# Patient Record
Sex: FEMALE | Race: WHITE | Employment: OTHER | URBAN - METROPOLITAN AREA
[De-identification: names, ages, dates, MRNs, and addresses within clinical notes are randomized per-mention and may not be internally consistent; named-entity substitution may affect disease eponyms.]

---

## 2018-02-01 ENCOUNTER — OFFICE VISIT (OUTPATIENT)
Dept: OTOLARYNGOLOGY | Facility: CLINIC | Age: 73
End: 2018-02-01
Payer: MEDICARE

## 2018-02-01 DIAGNOSIS — Z98.890 MOHS DEFECT OF NOSE: Primary | ICD-10-CM

## 2018-02-01 DIAGNOSIS — M95.0 MOHS DEFECT OF NOSE: Primary | ICD-10-CM

## 2018-02-01 PROCEDURE — 99205 OFFICE O/P NEW HI 60 MIN: CPT | Performed by: OTOLARYNGOLOGY

## 2018-02-01 RX ORDER — CLOPIDOGREL BISULFATE 75 MG/1
75 TABLET ORAL
COMMUNITY
Start: 2011-06-21

## 2018-02-01 RX ORDER — LISINOPRIL/HYDROCHLOROTHIAZIDE 10-12.5 MG
1 TABLET ORAL
COMMUNITY
Start: 2011-06-21

## 2018-02-01 RX ORDER — LEVOTHYROXINE SODIUM 88 UG/1
88 TABLET ORAL
COMMUNITY
Start: 2011-06-21

## 2018-02-01 RX ORDER — DILTIAZEM HYDROCHLORIDE 240 MG/1
240 CAPSULE, COATED, EXTENDED RELEASE ORAL
COMMUNITY
Start: 2011-06-21

## 2018-02-01 RX ORDER — ASPIRIN 81 MG/1
81 TABLET ORAL
COMMUNITY
Start: 2011-06-21

## 2018-02-01 RX ORDER — ATORVASTATIN CALCIUM 80 MG/1
80 TABLET, FILM COATED ORAL
COMMUNITY
Start: 2011-06-21

## 2018-02-01 RX ORDER — METOPROLOL TARTRATE 25 MG/1
25 TABLET, FILM COATED ORAL
COMMUNITY
Start: 2011-06-21

## 2018-02-01 ASSESSMENT — PAIN SCALES - GENERAL: PAINLEVEL: NO PAIN (0)

## 2018-02-01 NOTE — PATIENT INSTRUCTIONS
Wound Care Instructions:     Please keep your facial wound covered at all times with ointment to keep the wound healthy. Dryness and crusting means the wound is unhealthy. You many need to apply ointment every 2 hours while you are awake to keep the wound constantly moist. If the wound is near your eyes, use Erythromycin Ophthalmic Ointment (ointment that is safe to get into the eyes). Otherwise, if it is away from your eyes, use Vaseline on the wound. Make sure the wound is always moist and covered with ointment.    You can choose to wear a dressing or bandage to camoflauge the wound, but a dressing or bandage is not necessary unless you work in a dirty or ming environment. Covering the wound at all times with ointment to maintain moisture is necessary. If you wear a dressing or bandage, check under the dressing frequently to make sure the wound does not dry out.      You can shower and let the wound get wet in 48 hours. Do not scrub the wound. After your shower, gently pat the wound dry with a clean towel and apply more ointment.    Patients are often concerned about whether an open facial wound could get infected. It is very rare that an open wound gets a severe bacterial infection because bacteria can only sit on the surface of the wound and cannot penetrate into the deeper tissue leading to problems. Wounds commonly build up yellow or gray debris and appear infected even when they are not. This yellow or gray debris are waste products from the healing process combined with ointment. The best way to stop even the appearance of an infection is to keep your wound constantly moist and let the shower gently remove this debris once a day.      A more common type of infection is a minor fungal infection that results from keeping the wound moist with ointment. This is like diaper rash around your wound. Patients know that they have a fungal infection when they start experiencing severe itching, and discomfort around  the wound, and see discrete red patches away from the wound (called satellite lesions). If you suspect you have any type of infection, please call us.     Please contact our clinic if you have questions or if problems arise: [place] office: [phone]. If it is an urgent matter when the clinic is closed, please contact the [facility]  [phone] and ask to have Dr. Clinton Linares, or the ENT resident on-call paged. If it is a serious matter requiring immediate attention, please call 911 or go to your nearest emergency department.

## 2018-02-01 NOTE — PROGRESS NOTES
Surinder Armendariz MD   [facility]  [address] [place] [address]    Dear Doctor Marcello,    Thank you for asking me to see your patient, Ms. Minal Dias, in consultation to evaluate her nasal tip defect after Mohs surgery.  Today I had the pleasure of seeing her at the Facial Plastic and Reconstructive Surgery Clinic in the Department of Otolaryngology at [facility].    CLINICAL SUMMARY:   Diagnoses:  Nasal tip defect from basal cell carcinoma, s/p Mohs surgery by Dr. Armendariz.     Comorbidities: Diabetes, cardiomyopathy, carotid stenosis  Pertinent medications: Plavix, ASA  Photographs:  consents signed February 1, 2018.   Other: , 2 daughters, 3 grandchildren   Care Checklist:   _She requested time to consider her options and wants to return to discuss the options again in 3 weeks. She is deciding between secondary intention healing and reconstruction with straight line closure and if that distorts the nostrils excessively or creates nasal obstruction, we would either leave the wound partially open and continue with serial closure or perform a local flap for closure.   _Anticoagulation plan for plavix and ASA.       MEDICAL DECISION MAKING:      Nasal defect after Mohs surgery. The reconstructive options of healing by secondary intention versus skin grafting versus local flap reconstruction were described in detail.  After carefully considering the options, she requested time to consider her options and wants to return to discuss the options again in 3 weeks. She is deciding between secondary intention healing and reconstruction with straight line closure and if that distorts the nostrils excessively or creates nasal obstruction, we would either leave the wound partially open and continue with serial closure or perform a local flap for closure.     The patient understands the anticipated secondary intention healing  outcome of a depressed wound covered with scar tissue. We discussed the possibility of distortion of surrounding facial landmarks due to wound contracture including a change in her nostril margin that may result in nasal congestion or alar retraction. Our staff and I reviewed wound care in detail and emphasized the importance of maintaining wound moisture to promote healing.  I have asked her to follow up in 3 weeks for wound monitoring.  I encouraged her to call or return sooner if she has questions or if I can be of service.    It has been a pleasure to participate in the care of Ms. Dias.  Thank you for this kind referral.     Sincerely,    Clinton Linares MD    Division of Facial Plastic and Reconstructive Surgery,   Department of Otolaryngology  [facility]   ____________________________________________________          HISTORY OF PRESENT ILLNESS and SKIN CANCER QUESTIONAAIRE:  As you know, Ms. Dias is an72 year old-year-old female who presents with a facial defect after Mohs surgery.     Review of the Mohs or cancer removal documentation shows:   Date of Mohs surgery or skin cancer removal: 1/23/18.  Cancer type: basal cell carcinoma.  Margins: tumor free margins were obtained,  How would you rate your pain since your surgery? 0 (No pain)  Provider- How would you rate your pain since surgery? 0 (No pain)    Have you had any significant bleeding since your surgery? No  Provider- Have you had any significant bleeding since your surgery? No    Have you had any significant discharge since your surgery? No  Provider- Have you had any significant discharge since your surgery? No    Have you had any fevers since your surgery? No  Provider- Have you had any fevers since your surgery? No    No: Do you currently smoke?   Provider- Do you currently smoke? No    No: Have you had previous facial reconstruction?   Provider- Have you had previous facial reconstruction? Previous wound of  nasal tip was closed primarily in 2013    What was at the cancer site before the removal? bleeding sore  How long had you noticed the lesion or growth prior to having the removal? 2 month(s)  Did that lesion grow? Yes. If yes: Slow, No  Have you had a lot of sun exposure in the past? Yes    Do you remember blistering sunburns anywhere on your body? Yes  Have you used a tanning bed in the past? No    Have you smoked in the past?Yes  Have you had radiation to your head or neck in the past? No  Have you had previous skin cancers? Yes    For a defect site near or on the nose:   No: Did you have troubles breathing through your nose before Mohs surgery or cancer removal?   No: Any problems breathing through your nose after Mohs surgery or cancer removal?    Provider- Any problems breathing through your nose after Mohs surgery or cancer removal?  No        REVIEW OF SYSTEMS: a 12 system review was performed by the patient care staff:    Do you currently have or have you ever had in the past:    No: Complications with sedation or anesthesia.  Yes : Use blood thinners. Yes:  ASA.  Plavix.   Yes - cardiomyopathy: Any heart problems.   No: Chest pain.   No: A pacemaker.  No: Problems with excessive bleeding or a bleeding disorder.  No: Problems with blood clots or a clotting disorder.   No: Sleep apnea or sleep with a CPAP machine.       No: Excessive scarring.   No: Night sweats.   No: Fevers.   No: Double vision.   No: Vision loss.   Yes: Snoring.   No: Difficulty breathing through your nose.   Yes : Runny nose.   No: Sneezing.   No: Itchy eyes.   No: Itchy throat.   No: Face pain.   No: Face weakness.   No: Face numbness.   No: Difficulty swallowing.   No: Pain with swallowing.,   No: Difficulty hearing or hearing loss.   No: Difficulty urinating.   No: Anxiety.   No: Depression.     FAMILY HISTORY:  No: Family history of excessive bleeding or a bleeding disorder.    No: Family history of blood clots or a clotting  disorder.    Yes: Family history of skin cancer.    History reviewed. No pertinent family history.    PAST MEDICAL HISTORY: History reviewed. No pertinent past medical history. Heart disease, carotid stenosis.    PAST SURGICAL HISTORY: History reviewed. No pertinent surgical history. Cervical disc repaired. Laproscopic release of adhesions.     SOCIAL HISTORY:   Social History   Substance Use Topics     Smoking status: Not on file     Smokeless tobacco: Not on file     Alcohol use Not on file   no smoking.     ALLERGIES: Liquid adhesive and Sulfa drugs    MEDICATIONS:   Current Outpatient Prescriptions   Medication Sig Dispense Refill     aspirin EC 81 MG EC tablet Take 81 mg by mouth       atorvastatin (LIPITOR) 80 MG tablet Take 80 mg by mouth       clopidogrel (PLAVIX) 75 MG tablet Take 75 mg by mouth       diltiazem 240 MG 24 hr capsule Take 240 mg by mouth       levothyroxine (SYNTHROID/LEVOTHROID) 88 MCG tablet Take 88 mcg by mouth       lisinopril-hydrochlorothiazide (PRINZIDE/ZESTORETIC) 10-12.5 MG per tablet Take 1 tablet by mouth       metFORMIN (GLUCOPHAGE) 1000 MG tablet Take 1,000 mg by mouth       metoprolol tartrate (LOPRESSOR) 25 MG tablet Take 25 mg by mouth       tiZANidine (ZANAFLEX) 4 MG tablet        traMADol-acetaminophen (ULTRACET) 37.5-325 MG per tablet        SITagliptin Phosphate (JANUVIA PO) Take 25 mg by mouth daily         Defect size per Mohs record or operative report: 10mm x 9mm    Patient Care Staff Signature: Cynthia Pierce RN  ______________________________________________    Provider- Review of systems, FH, PMH, PSH, SH, ALL, and Medications taken by the patient care staff was reviewed by me: Clinton Linares      Provider- PHYSICAL EXAMINATION:  CONSTITUTIONAL:  No apparent distress.  Pleasant affect.  Normal ability to communicate.  CRANIOFACIAL:  Normocephalic, atraumatic.    SKIN:  10x9mm defect.   Subunits involved: central nasal tip centered just to the left of midline.    Nearby landmarks: left nostril margin 10mm from wound.   Depth: through dermis.  Surrounding skin is viable. No bleeding.    EYES:  Extraocular muscles intact.  EARS:  Normal auricles.  Tympanic membranes clear bilaterally.  NOSE: No external nasal valve collapse.  Slight septal deviation.  No polyps or purulence.  ORAL CAVITY AND OROPHARYNX: no lesions on inspection.  NECK:  The parotid is soft, without masses.  Supple laryngeal landmarks.  LYMPHATIC:  No palpable lymphadenopathy.  CARDIOVASCULAR:  Carotid pulses are palpable bilaterally.  NEUROLOGIC:  Facial nerve intact.  RESPIRATORY:  Normal respiratory effort.  No stridor.  Voice strong.      Provider-: PREOPERATIVE COUNSELING: An extensive preoperative discussion was held. The patient stated she understood the risks, benefits, alternatives and limitations of the procedure. The risks including but not limited to bleeding, infection, damage to surrounding structures, numbness, weakness, cancer recurrence, chronic pain, poor aesthetic result, partial or total skin loss, distortion of surrounding facial structures, and unforeseen complications related to surgery or anesthesia were described. I emphasized the risks of partial or total skin loss at the surgical sites. She also understands the possibility of distortion of surrounding facial structures including her nostril margin which may result in alar retraction or nasal congestion. I discussed the limitations of this procedure in that the donor site will have anticipated scars and complications can occur at the donor site.  She understands that more skin may need to be excised during the reconstruction. We discussed how additional surgeries may be needed to obtain an optimal result.    We discussed how the patient's heart disease may result in cardiac complications and anticoagluation may result in bleeding complications.    I described how no reconstructive effort will be able to restore her to her exact  precancer condition. We also acknowledged that facial asymmetries will be present after the reconstruction. The patient stated she had her questions answered to her satisfaction.

## 2018-02-01 NOTE — LETTER
2/1/2018         RE: Minal Dias  [address] [facility] [address]        Dear Colleague,    Thank you for referring your patient, Minal Dias, to the [facility]. Please see a copy of my visit note below.    Surinder Armendariz MD   [facility]  [address] [place] [id number]    Dear Doctor Marcello,    Thank you for asking me to see your patient, Ms. Minal Dias, in consultation to evaluate her nasal tip defect after Mohs surgery.  Today I had the pleasure of seeing her at the Facial Plastic and Reconstructive Surgery Clinic in the Department of Otolaryngology at [facility].    CLINICAL SUMMARY:   Diagnoses:  Nasal tip defect from basal cell carcinoma, s/p Mohs surgery by Dr. Armendariz.     Comorbidities: Diabetes, cardiomyopathy, carotid stenosis  Pertinent medications: Plavix, ASA  Photographs: [facility] consents signed February 1, 2018.   Other: , 2 daughters, 3 grandchildren   Care Checklist:   _She requested time to consider her options and wants to return to discuss the options again in 3 weeks. She is deciding between secondary intention healing and reconstruction with straight line closure and if that distorts the nostrils excessively or creates nasal obstruction, we would either leave the wound partially open and continue with serial closure or perform a local flap for closure.   _Anticoagulation plan for plavix and ASA.       MEDICAL DECISION MAKING:      Nasal defect after Mohs surgery. The reconstructive options of healing by secondary intention versus skin grafting versus local flap reconstruction were described in detail.  After carefully considering the options, she requested time to consider her options and wants to return to discuss the options again in 3 weeks. She is deciding between secondary intention healing and reconstruction with straight line closure and if that distorts the  nostrils excessively or creates nasal obstruction, we would either leave the wound partially open and continue with serial closure or perform a local flap for closure.     The patient understands the anticipated secondary intention healing outcome of a depressed wound covered with scar tissue. We discussed the possibility of distortion of surrounding facial landmarks due to wound contracture including a change in her nostril margin that may result in nasal congestion or alar retraction. Our staff and I reviewed wound care in detail and emphasized the importance of maintaining wound moisture to promote healing.  I have asked her to follow up in 3 weeks for wound monitoring.  I encouraged her to call or return sooner if she has questions or if I can be of service.    It has been a pleasure to participate in the care of Ms. Dias.  Thank you for this kind referral.     Sincerely,    Clinton Linares MD    Division of Facial Plastic and Reconstructive Surgery,   Department of Otolaryngology  [facility]   ____________________________________________________          HISTORY OF PRESENT ILLNESS and SKIN CANCER QUESTIONAAIRE:  As you know, Ms. Dias is an72 year old-year-old female who presents with a facial defect after Mohs surgery.     Review of the Mohs or cancer removal documentation shows:   Date of Mohs surgery or skin cancer removal: 1/23/18.  Cancer type: basal cell carcinoma.  Margins: tumor free margins were obtained,  How would you rate your pain since your surgery? 0 (No pain)  Provider- How would you rate your pain since surgery? 0 (No pain)    Have you had any significant bleeding since your surgery? No  Provider- Have you had any significant bleeding since your surgery? No    Have you had any significant discharge since your surgery? No  Provider- Have you had any significant discharge since your surgery? No    Have you had any fevers since your surgery? No  Provider-  Have you had any fevers since your surgery? No    No: Do you currently smoke?   Provider- Do you currently smoke? No    No: Have you had previous facial reconstruction?   Provider- Have you had previous facial reconstruction? Previous wound of nasal tip was closed primarily in 2013    What was at the cancer site before the removal? bleeding sore  How long had you noticed the lesion or growth prior to having the removal? 2 month(s)  Did that lesion grow? Yes. If yes: Slow, No  Have you had a lot of sun exposure in the past? Yes    Do you remember blistering sunburns anywhere on your body? Yes  Have you used a tanning bed in the past? No    Have you smoked in the past?Yes  Have you had radiation to your head or neck in the past? No  Have you had previous skin cancers? Yes    For a defect site near or on the nose:   No: Did you have troubles breathing through your nose before Mohs surgery or cancer removal?   No: Any problems breathing through your nose after Mohs surgery or cancer removal?    Provider- Any problems breathing through your nose after Mohs surgery or cancer removal?  No        REVIEW OF SYSTEMS: a 12 system review was performed by the patient care staff:    Do you currently have or have you ever had in the past:    No: Complications with sedation or anesthesia.  Yes : Use blood thinners. Yes:  ASA.  Plavix.   Yes - cardiomyopathy: Any heart problems.   No: Chest pain.   No: A pacemaker.  No: Problems with excessive bleeding or a bleeding disorder.  No: Problems with blood clots or a clotting disorder.   No: Sleep apnea or sleep with a CPAP machine.       No: Excessive scarring.   No: Night sweats.   No: Fevers.   No: Double vision.   No: Vision loss.   Yes: Snoring.   No: Difficulty breathing through your nose.   Yes : Runny nose.   No: Sneezing.   No: Itchy eyes.   No: Itchy throat.   No: Face pain.   No: Face weakness.   No: Face numbness.   No: Difficulty swallowing.   No: Pain with swallowing.,   No:  Difficulty hearing or hearing loss.   No: Difficulty urinating.   No: Anxiety.   No: Depression.     FAMILY HISTORY:  No: Family history of excessive bleeding or a bleeding disorder.    No: Family history of blood clots or a clotting disorder.    Yes: Family history of skin cancer.    History reviewed. No pertinent family history.    PAST MEDICAL HISTORY: History reviewed. No pertinent past medical history. Heart disease, carotid stenosis.    PAST SURGICAL HISTORY: History reviewed. No pertinent surgical history. Cervical disc repaired. Laproscopic release of adhesions.     SOCIAL HISTORY:   Social History   Substance Use Topics     Smoking status: Not on file     Smokeless tobacco: Not on file     Alcohol use Not on file   no smoking.     ALLERGIES: Liquid adhesive and Sulfa drugs    MEDICATIONS:   Current Outpatient Prescriptions   Medication Sig Dispense Refill     aspirin EC 81 MG EC tablet Take 81 mg by mouth       atorvastatin (LIPITOR) 80 MG tablet Take 80 mg by mouth       clopidogrel (PLAVIX) 75 MG tablet Take 75 mg by mouth       diltiazem 240 MG 24 hr capsule Take 240 mg by mouth       levothyroxine (SYNTHROID/LEVOTHROID) 88 MCG tablet Take 88 mcg by mouth       lisinopril-hydrochlorothiazide (PRINZIDE/ZESTORETIC) 10-12.5 MG per tablet Take 1 tablet by mouth       metFORMIN (GLUCOPHAGE) 1000 MG tablet Take 1,000 mg by mouth       metoprolol tartrate (LOPRESSOR) 25 MG tablet Take 25 mg by mouth       tiZANidine (ZANAFLEX) 4 MG tablet        traMADol-acetaminophen (ULTRACET) 37.5-325 MG per tablet        SITagliptin Phosphate (JANUVIA PO) Take 25 mg by mouth daily         Defect size per Mohs record or operative report: 10mm x 9mm    Patient Care Staff Signature: Cynthia Pierce RN  ______________________________________________    Provider- Review of systems, FH, PMH, PSH, SH, ALL, and Medications taken by the patient care staff was reviewed by me: Clinton Linares      Provider- PHYSICAL  EXAMINATION:  CONSTITUTIONAL:  No apparent distress.  Pleasant affect.  Normal ability to communicate.  CRANIOFACIAL:  Normocephalic, atraumatic.    SKIN:  10x9mm defect.   Subunits involved: central nasal tip centered just to the left of midline.   Nearby landmarks: left nostril margin 10mm from wound.   Depth: through dermis.  Surrounding skin is viable. No bleeding.    EYES:  Extraocular muscles intact.  EARS:  Normal auricles.  Tympanic membranes clear bilaterally.  NOSE: No external nasal valve collapse.  Slight septal deviation.  No polyps or purulence.  ORAL CAVITY AND OROPHARYNX: no lesions on inspection.  NECK:  The parotid is soft, without masses.  Supple laryngeal landmarks.  LYMPHATIC:  No palpable lymphadenopathy.  CARDIOVASCULAR:  Carotid pulses are palpable bilaterally.  NEUROLOGIC:  Facial nerve intact.  RESPIRATORY:  Normal respiratory effort.  No stridor.  Voice strong.      Provider-: PREOPERATIVE COUNSELING: An extensive preoperative discussion was held. The patient stated she understood the risks, benefits, alternatives and limitations of the procedure. The risks including but not limited to bleeding, infection, damage to surrounding structures, numbness, weakness, cancer recurrence, chronic pain, poor aesthetic result, partial or total skin loss, distortion of surrounding facial structures, and unforeseen complications related to surgery or anesthesia were described. I emphasized the risks of partial or total skin loss at the surgical sites. She also understands the possibility of distortion of surrounding facial structures including her nostril margin which may result in alar retraction or nasal congestion. I discussed the limitations of this procedure in that the donor site will have anticipated scars and complications can occur at the donor site.  She understands that more skin may need to be excised during the reconstruction. We discussed how additional surgeries may be needed to obtain an  optimal result.    We discussed how the patient's heart disease may result in cardiac complications and anticoagluation may result in bleeding complications.    I described how no reconstructive effort will be able to restore her to her exact precancer condition. We also acknowledged that facial asymmetries will be present after the reconstruction. The patient stated she had her questions answered to her satisfaction.    Again, thank you for allowing me to participate in the care of your patient.        Sincerely,        Clinton Linares MD

## 2018-02-01 NOTE — MR AVS SNAPSHOT
After Visit Summary   2/1/2018    Minal Dias    MRN: [id number]           Patient Information     Date Of Birth          1945        Visit Information        Provider Department      2/1/2018 12:00 PM Clinton Linares MD [facility]        Today's Diagnoses     Mohs defect of nose    -  1      Care Instructions    Wound Care Instructions:     Please keep your facial wound covered at all times with ointment to keep the wound healthy. Dryness and crusting means the wound is unhealthy. You many need to apply ointment every 2 hours while you are awake to keep the wound constantly moist. If the wound is near your eyes, use Erythromycin Ophthalmic Ointment (ointment that is safe to get into the eyes). Otherwise, if it is away from your eyes, use Vaseline on the wound. Make sure the wound is always moist and covered with ointment.    You can choose to wear a dressing or bandage to camoflauge the wound, but a dressing or bandage is not necessary unless you work in a dirty or ming environment. Covering the wound at all times with ointment to maintain moisture is necessary. If you wear a dressing or bandage, check under the dressing frequently to make sure the wound does not dry out.      You can shower and let the wound get wet in 48 hours. Do not scrub the wound. After your shower, gently pat the wound dry with a clean towel and apply more ointment.    Patients are often concerned about whether an open facial wound could get infected. It is very rare that an open wound gets a severe bacterial infection because bacteria can only sit on the surface of the wound and cannot penetrate into the deeper tissue leading to problems. Wounds commonly build up yellow or gray debris and appear infected even when they are not. This yellow or gray debris are waste products from the healing process combined with ointment. The best way to stop even the appearance of an infection is to keep your  wound constantly moist and let the shower gently remove this debris once a day.      A more common type of infection is a minor fungal infection that results from keeping the wound moist with ointment. This is like diaper rash around your wound. Patients know that they have a fungal infection when they start experiencing severe itching, and discomfort around the wound, and see discrete red patches away from the wound (called satellite lesions). If you suspect you have any type of infection, please call us.     Please contact our clinic if you have questions or if problems arise: [place] office: [phone]. If it is an urgent matter when the clinic is closed, please contact the [facility]  [phone] and ask to have Dr. Clinton Linares, or the ENT resident on-call paged. If it is a serious matter requiring immediate attention, please call 911 or go to your nearest emergency department.            Follow-ups after your visit        Follow-up notes from your care team     Return in about 3 weeks (around 2/22/2018).      Your next 10 appointments already scheduled     Feb 22, 2018 10:00 AM CST   Return Visit with Clinton Linares MD   [facility]    [id number] [place] [id number] [phone]              Who to contact     If you have questions or need follow up information about today's clinic visit or your schedule please contact [facility] directly at [phone].  Normal or non-critical lab and imaging results will be communicated to you by MyChart, letter or phone within 4 business days after the clinic has received the results. If you do not hear from us within 7 days, please contact the clinic through MyChart or phone. If you have a critical or abnormal lab result, we will notify you by phone as soon as possible.  Submit refill requests through [employer] or call your pharmacy and they will forward the  refill request to us. Please allow 3 business days for your refill to be completed.          Additional Information About Your Visit        [employer] Information     [employer] is an electronic gateway that provides easy, online access to your medical records. With [employer], you can request a clinic appointment, read your test results, renew a prescription or communicate with your care team.     To sign up for [employer] visit the website at [email or web address]   You will be asked to enter the access code listed below, as well as some personal information. Please follow the directions to create your username and password.     Your access code is: [email or web address]  Expires: 2018  1:11 PM     Your access code will  in 90 days. If you need help or a new code, please contact your [facility] or call [phone] for assistance.        Care EveryWhere ID     This is your Care EveryWhere ID. This could be used by other organizations to access your [place] medical records  [id number]         Blood Pressure from Last 3 Encounters:   No data found for BP    Weight from Last 3 Encounters:   No data found for Wt              Today, you had the following     No orders found for display       Primary Care Provider Office Phone # Fax #    [facility] [phone]       [address]  [facility] [address]        Equal Access to Services     MADDISON BLAKELY : Hadii aad ku hadasho Soomaali, waaxda luqadaha, qaybta kaalmada adeegyada, waxay gem haymike peraza. So [facility] [phone].    ATENCIÓN: Si habla español, tiene a mcgill disposición servicios gratuitos de asistencia lingüística. Llame al [phone].    We comply with applicable federal civil rights laws and [place] laws. We do not discriminate on the basis of race, color, national origin, age, disability, sex, sexual orientation, or gender identity.            Thank you!     Thank you  for choosing [facility]  for your care. Our goal is always to provide you with excellent care. Hearing back from our patients is one way we can continue to improve our services. Please take a few minutes to complete the written survey that you may receive in the mail after your visit with us. Thank you!             Your Updated Medication List - Protect others around you: Learn how to safely use, store and throw away your medicines at www.disposemymeds.org.          This list is accurate as of 2/1/18  1:25 PM.  Always use your most recent med list.                   Brand Name Dispense Instructions for use Diagnosis    aspirin EC 81 MG EC tablet      Take 81 mg by mouth        atorvastatin 80 MG tablet    LIPITOR     Take 80 mg by mouth        clopidogrel 75 MG tablet    PLAVIX     Take 75 mg by mouth        diltiazem 240 MG 24 hr capsule      Take 240 mg by mouth        JANUVIA PO      Take 25 mg by mouth daily        levothyroxine 88 MCG tablet    SYNTHROID/LEVOTHROID     Take 88 mcg by mouth        lisinopril-hydrochlorothiazide 10-12.5 MG per tablet    PRINZIDE/ZESTORETIC     Take 1 tablet by mouth        metFORMIN 1000 MG tablet    GLUCOPHAGE     Take 1,000 mg by mouth        metoprolol tartrate 25 MG tablet    LOPRESSOR     Take 25 mg by mouth        tiZANidine 4 MG tablet    ZANAFLEX          traMADol-acetaminophen 37.5-325 MG per tablet    ULTRACET

## 2019-11-30 ENCOUNTER — HOSPITAL ENCOUNTER (OUTPATIENT)
Dept: MRI IMAGING | Facility: CLINIC | Age: 74
Discharge: HOME | End: 2019-11-30
Attending: NURSE PRACTITIONER | Admitting: NURSE PRACTITIONER
Payer: MEDICARE

## 2019-11-30 DIAGNOSIS — M54.16 LUMBAR RADICULOPATHY, RIGHT: ICD-10-CM

## 2019-11-30 PROCEDURE — 72148 MRI LUMBAR SPINE W/O DYE: CPT

## 2019-12-12 ENCOUNTER — TRANSFERRED RECORDS (OUTPATIENT)
Dept: HEALTH INFORMATION MANAGEMENT | Facility: CLINIC | Age: 74
End: 2019-12-12

## 2019-12-16 ENCOUNTER — TELEPHONE (OUTPATIENT)
Dept: PALLIATIVE MEDICINE | Facility: CLINIC | Age: 74
End: 2019-12-16

## 2019-12-16 NOTE — TELEPHONE ENCOUNTER
"Received 12-    [facility] (Theresa Abdi, NP) faxed an L4-5 interlaminar BENJIE order for lumbar radiculopathy, right (M54.16). Per comments section on order \"Can do L5-S1 or L4-5 if epidural space not adequate.\"    Included in fax was the following:    Patient facesheet with billing/payor info    A specific [facility] site to schedule at    Excluded from fax was:    Recent progress notes    I faxed [facility] back and requested that they fax us the progress notes for patient's recent appts. Fax confirmation was received.        Routing to the .    **Please close encounter once everything (scheduling, prior authorization, etc) has been done.**      Roxy [place]  Patient Representative  [facility]  "

## 2019-12-18 NOTE — TELEPHONE ENCOUNTER
Called pt and left message to call back and informed the  staff the following information:   1.  Name of the prescribing provider  2. Clinic name  3. Clinic phone #    Rosamaria Rebolledo RN-BSN  [facility]

## 2019-12-18 NOTE — TELEPHONE ENCOUNTER
Patient called back:      1. Lizeth Gomez    2. Bryce Timmons    3. [phone]      Saritha Escalera    [facility]

## 2019-12-18 NOTE — TELEPHONE ENCOUNTER
Received 12-    [facility] faxed the requested recent progress notes to [facility]. I indexed notes into this encounter.      Roxy [place]  Patient Representative  [facility]

## 2019-12-18 NOTE — TELEPHONE ENCOUNTER
Pre-screening Questions for Radiology Injections:    Injection to be done at which interventional clinic site? [facility] [place]    Instruct patient to arrive as directed prior to the scheduled appointment time:    [place] minutes before      [place]: 30 minutes before; if IV needed 1 hour before     Procedure ordered by Jin    Procedure ordered? L4-5 interlaminar BENJIE      Transforaminal Cervical BENJIE - Dr. Joann Everett ONLY    What insurance would patient like us to bill for this procedure? Medicare/BC      Worker's comp or MVA (motor vehicle accident) -Any injection DO NOT SCHEDULE and route to Audelia Fuller.      [employer] insurance - For SI joint injections, DO NOT SCHEDULE and route Audelia Fuller.       [employer] - Any injection besides hip/shoulder/knee joint DO NOT SCHEDULE and route to Audelia Fuller. She will obtain PA and call pt back to schedule procedure or notify pt of denial.       HP [employer]-Route to Audelia for review      **BCBS- ALL need to be routed to [place] for review if a PA is needed**      IF SCHEDULING IN [place] AND NEEDS A PA, IT IS OKAY TO SCHEDULE. [place] HANDLES THEIR OWN PA'S AFTER THE PATIENT IS SCHEDULED. PLEASE SCHEDULE AT LEAST 1 WEEK OUT SO A PA CAN BE OBTAINED.    Any chance of pregnancy? NO   If YES, do NOT schedule and route to RN pool    Is an  needed? No     Patient has a drive home? (mandatory) YES: ok    Is patient taking any blood thinners (i.e. plavix, coumadin, jantoven, warfarin, heparin, pradaxa or dabigatran, etc)? Yes - Plavix   If hold needed, do NOT schedule, route to RN pool     Is patient taking any aspirin products (includes Excedrin and Fiorinal)? Yes - Pt takes 81mg daily; instructed to hold 0 day(s) prior to procedure.      If more than 325mg/day do NOT schedule; route to RN pool     For CERVICAL procedures, hold all aspirin products for 6 days.     Tell pt that if aspirin product is not held for 6 days, the procedure WILL  BE cancelled.      Does the patient have a bleeding or clotting disorder? No     If YES, okay to schedule AND route to RN nurse pool    For any patients with platelet count <100, must be forwarded to provider    Is patient diabetic?  Yes  If YES, instruct them to bring their glucometer.    Does patient have an active infection or treated for one within the past week? No     Is patient currently taking any antibiotics?  No     For patients on chronic, preventative, or prophylactic antibiotics, procedures may be scheduled.     For patients on antibiotics for active or recent infection:antibiotic course must have been completed for 4 days    Is patient currently taking any steroid medications? (i.e. Prednisone, Medrol)  No     For patients on steroid medications, course must have been completed for 4 days    Reviewed with patient:  If you are started on any steroids or antibiotics between now and your appointment, you must contact us because the procedure may need to be cancelled.  Yes    Is patient actively being treated for cancer or immunocompromised? No  If YES, do NOT schedule and route to RN pool     Are you able to get on and off an exam table with minimal or no assistance? Yes  If NO, do NOT schedule and route to RN pool    Are you able to roll over and lay on your stomach with minimal or no assistance? Yes  If NO, do NOT schedule and route to RN pool     Any allergies to contrast dye, iodine, shellfish, or numbing and steroid medications? No  If YES, route to RN pool AND add allergy information to appointment notes    Allergies: Liquid adhesive and Sulfa drugs      Has the patient had a flu shot or any other vaccinations within 7 days before or after the procedure.  No     Does patient have an MRI/CT?  YES: MRI  Check Procedure Scheduling Grid to see if required.      Was the MRI done within the last 3 years?  Yes    If yes, where [facility] etc? [facility]  [place]      If no, do not schedule and route to RN pool    If MRI was not done at [place], [facility] or [facility] do NOT schedule and route to RN pool.      If pt has an imaging disc, the injection MAY be scheduled but pt has to bring disc to appt.     If they show up without the disc the injection cannot be done    Reminders (please tell patient if applicable):       Instructed pt to arrive 30 minutes early for IV start if required. (Check Procedure Scheduling Grid)  Not Applicable      If celiac plexus block, informed patient NPO for 6 hours and that it is okay to take medications with sips of water, especially blood pressure medications  Not Applicable         If this is for a cervical procedure, informed patient that aspirin needs to be held for 6 days.   Not Applicable      For all patients not having spinal cord stimulator (SCS) trials or radiofrequency ablations (RFAs), informed patient:    IV sedation is not provided for this procedure.  If you feel that an oral anti-anxiety medication is needed, you can discuss this further with your referring provider or primary care provider.  The Pain Clinic provider will discuss specifics of what the procedure includes at your appointment.  Most procedures last 10-20 minutes.  We use numbing medications to help with any discomfort during the procedure.  Not Applicable      Do not schedule procedures requiring IV placement in the first appointment of the day or first appointment after lunch. Do NOT schedule at 0745, 0815 or 1245.       For patients 85 or older we recommend having an adult stay w/ them for the remainder of the day.       Does the patient have any questions?  NO  Kita Valles  [facility]

## 2019-12-19 NOTE — TELEPHONE ENCOUNTER
Called Dr. Gomez's office.  They will call back on shared line w/ the approval/denial for the 7 day hold.    Rosamaria Rebolledo RN-BSN  [facility]

## 2019-12-20 NOTE — TELEPHONE ENCOUNTER
No PA required, okay to schedule        Audelia PATRICIA    [facility]

## 2019-12-23 NOTE — TELEPHONE ENCOUNTER
Dr. Gomez's office called back.  Okay to hold the plavix for the 7 days.    Called pt.     Injection scheduled for:  1/7/19  Plavix hold starts on:  1/31/19  Is lovenox bridging needed?  Not Applicable  Injection intake questions reviewed?  YES  Infection/antibiotic information reviewed?  YES  INR order in?: Not Applicable  Lab appointment done?  Not Applicable    Rosamaria Rebolledo RN-BSN  [facility]

## 2019-12-23 NOTE — TELEPHONE ENCOUNTER
Bee and Liz from Dr. Gomez's office calling back.  They need to know what procedure is going to be done. [phone].    Called back ands informed the staff there that it is for a lumbar epidural steroid injection.    Waiting for response back.    Rosamaria Rebolledo, RN-BSN  [facility]

## 2020-01-07 ENCOUNTER — ANCILLARY PROCEDURE (OUTPATIENT)
Dept: RADIOLOGY | Facility: CLINIC | Age: 75
End: 2020-01-07
Attending: PAIN MEDICINE
Payer: MEDICARE

## 2020-01-07 ENCOUNTER — RADIOLOGY INJECTION OFFICE VISIT (OUTPATIENT)
Dept: PALLIATIVE MEDICINE | Facility: CLINIC | Age: 75
End: 2020-01-07
Payer: MEDICARE

## 2020-01-07 VITALS — SYSTOLIC BLOOD PRESSURE: 191 MMHG | DIASTOLIC BLOOD PRESSURE: 84 MMHG | HEART RATE: 54 BPM | OXYGEN SATURATION: 97 %

## 2020-01-07 DIAGNOSIS — M54.16 LUMBAR RADICULOPATHY: Primary | ICD-10-CM

## 2020-01-07 DIAGNOSIS — M54.16 LUMBAR RADICULOPATHY: ICD-10-CM

## 2020-01-07 PROCEDURE — 62323 NJX INTERLAMINAR LMBR/SAC: CPT | Performed by: PAIN MEDICINE

## 2020-01-07 ASSESSMENT — PAIN SCALES - GENERAL: PAINLEVEL: MODERATE PAIN (5)

## 2020-01-07 NOTE — NURSING NOTE
Discharge Information    IV Discontiued Time:  NA    Amount of Fluid Infused:  NA    Discharge Criteria = When patient returns to baseline or as per MD order    Consciousness:  Pt is fully awake    Circulation:  BP +/- 20% of pre-procedure level    Respiration:  Patient is able to breathe deeply    O2 Sat:  Patient is able to maintain O2 Sat >92% on room air    Activity:  Moves 4 extremities on command    Ambulation:  Patient is able to stand and walk or stand and pivot into wheelchair    Dressing:  Clean/dry or No Dressing    Notes:   Discharge instructions and AVS given to patient    Patient meets criteria for discharge?  YES    Admitted to PCU?  No    Responsible adult present to accompany patient home?  Yes    Signature/Title:    corky hernandez RN  RN Care Coordinator  [facility] [place]

## 2020-01-07 NOTE — PROGRESS NOTES
Pre procedure Diagnosis: Lumbar radiculopathy  Post procedure Diagnosis: Same  Procedure performed: L4-5 interlaminar epidural steroid injection   Anesthesia: None  Complications: None  Operators: Alfredo Ness MD     Indications:   Minal Dias is a 74 year old female.  The patient has a history of right-sided low back pain radiating to her lower extremity.  Specifically to her anterior ankle.  Examination shows negative SLR.  she has tried conservative treatment including meds.    MR  T12-L1: Minimal facet hypertrophy. Normal disc. No stenosis.     L1-L2: Mild facet hypertrophy and minimal disc bulging is present.  There is no stenosis.     L2-L3: Broad-based disc bulging and moderate facet and ligament flavum  hypertrophy is present causing mild to moderate central canal stenosis  but no neural foraminal stenosis.     L3-L4: Broad-based disc bulge and moderate to severe facet and  ligament flavum hypertrophy is present along with some minimal  spondylolisthesis. There is secondary moderate central canal stenosis  and mild bilateral neural foraminal stenosis.     L4-L5: Grade 1 degenerative spondylolisthesis is present along with  severe facet and ligamentum flavum hypertrophy and some broad-based  disc bulging. There is also a right posterolateral disc protrusion.  There is secondary moderate to severe central canal stenosis, moderate  right-sided foraminal stenosis and mild left-sided foraminal stenosis.     L5-S1: Moderate facet hypertrophy is present. The intervertebral discs  show some minimal disc bulging. There is no significant stenosis.     Paraspinal soft tissues: There is a focal indeterminate 1 cm lesion  within the lateral aspect of the right kidney.                                                                      IMPRESSION:  1. Multilevel degenerative disc and facet disease most advanced at  L4-L5 where there is moderate to severe central canal stenosis,  moderate right-sided foraminal  stenosis and mild left-sided foraminal  stenosis. There is also a right posterolateral disc protrusion at this  level.  2. Indeterminate 1 cm right renal lesion. If clinically indicated,  ultrasound might be helpful in further evaluation.  Options/alternatives, benefits and risks were discussed with the patient including but not limited to bleeding, infection, no pain relief, tissue trauma, exposure to radiation, reaction to medications, spinal cord injury, dural puncture, weakness, numbness and headache.  Questions were answered to her satisfaction and she wishes to proceed. Voluntary informed consent was obtained and signed.     Vitals were reviewed: Yes  Allergies were reviewed:  Yes   Medications were reviewed:  Yes  Pre-procedure pain score: 5/10    Procedure:  The patient's medical history, medications, and allergies were reviewed and reconciled.  After obtaining signed informed consent, the patient was brought into the procedure suite and was placed in a prone position on the procedure table.   A Pause for the Cause was performed.  Patient was prepped and draped in the usual sterile fashion.     The L 4-5 interspace was identified with AP fluoroscopy.  A total of 1ml of 1% lidocaine was used to anesthetize the skin and subcutaneous tissues for a right of midline approach.    A 20gauge 3.5inch Touhy needle was advanced utilizing intermittent AP and Lateral fluoroscopy and air for loss of resistance.  The epidural space was encountered on the first pass without difficulties.  Aspiration for blood and CSF was negative.  Needle position was verified by injecting 1 ml of Omnipaque utilizing real-time fluoroscopy that showed good needle placement and epidural spread without signs of intravascular or intrathecal uptake.  Omnipaque wasted:  9 ml.    Then, after repeated negative aspiration for blood or CSF, a combination of Kenalog 40 mg, Bupivicaine 0.25% 2 ml, diluted with 3 ml of normal saline to a total injectate  volume of 6 ml was injected into the epidural space in a slow and incremental fashion and the needle was restyletted and withdrawn.  All injected medications were preservative free.    The injection site was cleaned and a sterile dressing was applied.    The patient tolerated the procedure well without complications and was taken to the recovery room for continued observation.    Images were saved to PACS.    Post-procedure pain score: 5/10  Follow-up includes:   -f/u phone call in one week  -f/u with referring provider     Alfredo Ness MD  [facility] [place]

## 2020-01-07 NOTE — NURSING NOTE
Pre-procedure Intake    Have you been fasting? No     If yes, for how long? No     Are you taking a prescribed blood thinner such as coumadin, Plavix, Xarelto? Yes, plavix    If yes, when did you take your last dose? 12/30/19    Do you take aspirin?  No    If cervical procedure, have you held aspirin for 6 days?   No     Do you have any allergies to contrast dye, iodine, steroid and/or numbing medications?  NO    Are you currently taking antibiotics or have an active infection?  NO    Have you had a fever/elevated temperature within the past week? NO    Are you currently taking oral steroids? NO    Do you have a ? Yes       Are you pregnant or breastfeeding?  NO    Are the vital signs normal?  No   Lena Luna MA

## 2020-01-07 NOTE — PATIENT INSTRUCTIONS
Restart plavix [facility]   Procedure Discharge Instructions    Today you saw:  Dr. Alfredo Ness     You had an:  Epidural steroid injection   -lumbar  Medications used:  Lidocaine   Bupivacaine Omnipaque    Kenalog    Normal saline          Be cautious when walking. Numbness and/or weakness in the lower extremities may occur for up to 6-8 hours after the procedure due to effect of the local anesthetic    Do not drive for 6 hours. The effect of the local anesthetic could slow your reflexes.     You may resume your regular activities after 24 hours    Avoid strenuous activity for the first 24 hours    You may shower, however avoid swimming, tub baths or hot tubs for 24 hours following your procedure    You may have a mild to moderate increase in pain for several days following the injection.    It may take up to 14 days for the steroid medication to start working although you may feel the effect as early as a few days after the procedure.       You may use ice packs for 10-15 minutes, 3 to 4 times a day at the injection site for comfort    Do not use heat to painful areas for 6 to 8 hours. This will give the local anesthetic time to wear off and prevent you from accidentally burning your skin.     Unless you have been directed to avoid the use of anti-inflammatory medications (NSAIDS), you may use medications such as ibuprofen, Aleve or Tylenol for pain control if needed.     If you were fasting, you may resume your normal diet and medications after the procedure    If you have diabetes, check your blood sugar more frequently than usual as your blood sugar may be higher than normal for 10-14 days following a steroid injection. Contact your doctor who manages your diabetes if your blood sugar is higher than usual    Possible side effects of steroids that you may experience include flushing, elevated blood pressure, increased appetite, mild headaches and restlessness.  All  of these symptoms will get better with time.    If you experience any of the following, call the [facility] during work hours (Mon-Friday 8-4:30 pm) at [phone] or the Provider Line after hours at [phone]:  -Fever over 100 degree F  -Swelling, bleeding, redness, drainage, warmth at the injection site  -Progressive weakness or numbness in your legs or arms  -Loss of bowel or bladder function  -Unusual headache that is not relieved by Tylenol or other pain reliever  -Unusual new onset of pain that is not improving

## 2020-01-14 ENCOUNTER — TELEPHONE (OUTPATIENT)
Dept: PALLIATIVE MEDICINE | Facility: CLINIC | Age: 75
End: 2020-01-14

## 2020-01-14 NOTE — TELEPHONE ENCOUNTER
Patient had a  L4-5 interlaminar epidural steroid  injection on 1/14/2020.  Called patient for an update.      Left message that we were calling for an update about how s/he was doing after the injection.  LM that if s/he has any problems or questions to call the clinic at [phone].